# Patient Record
Sex: MALE | Race: WHITE | NOT HISPANIC OR LATINO | Employment: OTHER | URBAN - METROPOLITAN AREA
[De-identification: names, ages, dates, MRNs, and addresses within clinical notes are randomized per-mention and may not be internally consistent; named-entity substitution may affect disease eponyms.]

---

## 2020-04-22 ENCOUNTER — LAB VISIT (OUTPATIENT)
Dept: LAB | Facility: HOSPITAL | Age: 40
End: 2020-04-22
Attending: INTERNAL MEDICINE
Payer: COMMERCIAL

## 2020-04-22 DIAGNOSIS — Z00.00 PREVENTATIVE HEALTH CARE: Primary | ICD-10-CM

## 2020-04-22 DIAGNOSIS — Z00.00 PREVENTATIVE HEALTH CARE: ICD-10-CM

## 2020-04-22 LAB — SARS-COV-2 IGG SERPL QL IA: NEGATIVE

## 2020-04-22 PROCEDURE — 86769 SARS-COV-2 COVID-19 ANTIBODY: CPT

## 2020-04-22 PROCEDURE — 36415 COLL VENOUS BLD VENIPUNCTURE: CPT | Mod: PO

## 2023-11-10 ENCOUNTER — OFFICE VISIT (OUTPATIENT)
Dept: NEUROSURGERY | Facility: CLINIC | Age: 43
End: 2023-11-10

## 2023-11-10 DIAGNOSIS — M50.30 DDD (DEGENERATIVE DISC DISEASE), CERVICAL: Primary | ICD-10-CM

## 2023-11-10 DIAGNOSIS — M50.10 CERVICAL DISC DISORDER WITH RADICULOPATHY: ICD-10-CM

## 2023-11-10 PROCEDURE — 99203 PR OFFICE/OUTPT VISIT, NEW, LEVL III, 30-44 MIN: ICD-10-PCS | Mod: S$PBB,,, | Performed by: STUDENT IN AN ORGANIZED HEALTH CARE EDUCATION/TRAINING PROGRAM

## 2023-11-10 PROCEDURE — 99203 OFFICE O/P NEW LOW 30 MIN: CPT | Mod: S$PBB,,, | Performed by: STUDENT IN AN ORGANIZED HEALTH CARE EDUCATION/TRAINING PROGRAM

## 2023-11-10 RX ORDER — CYCLOBENZAPRINE HYDROCHLORIDE 7.5 MG/1
7.5 TABLET, FILM COATED ORAL 3 TIMES DAILY PRN
Qty: 18 TABLET | Refills: 0 | Status: SHIPPED | OUTPATIENT
Start: 2023-11-10 | End: 2023-11-20

## 2023-11-10 RX ORDER — METAXALONE 800 MG/1
800 TABLET ORAL 3 TIMES DAILY
Qty: 30 TABLET | Refills: 0 | Status: SHIPPED | OUTPATIENT
Start: 2023-11-10 | End: 2023-11-20

## 2023-11-10 NOTE — PROGRESS NOTES
[facility] Neurosurgery [facility]  Clinic Consult     Consult Requested By: No, Primary Doctor, Self, Aaareferral        SUBJECTIVE:     Chief Complaint: No chief complaint on file.      History of Present Illness:  Ever Castillo is a 43 y.o. male who presents with history of a bad left shoulder.  Neck pain intermittent arm pain.  He is numbness in the left 2 digits in the ulnar distribution he is status post cubital tunnel decompression.  He is had on and off neck pain however this has progressed after lifting a suitcase.  He is having severe parascapular pain.  Limited range of motion in his neck.  Had physical therapy and dry needling;  He managed this with anti-inflammatories and a Medrol Dosepak.  However it is progressed over the week now he feels some subjective weakness in the right upper extremity.  More severe pain.  Can not sleep has developed paresthesias and numbness in the 1st and 2nd potentially 3rd digit.  Parascapular pain going down the triceps      Pertinent and Recent history, provider evaluations, imaging and data reviewed in EPIC             Review of patient's allergies indicates:  Not on File    No past medical history on file.  No past surgical history on file.  No family history on file.        Review of Systems:      Constitutional: no fever, chills or night sweats. No abrupt changes in weight         OBJECTIVE:     Vital Signs (Most Recent):       Physical Exam:      General: well developed, well nourished, no distress. .  Mental Status: Awake, Alert, Oriented x 4  Language: No aphasia  Speech: No dysarthria  Head: normocephalic, atraumatic.  Neck: trachea midline, no JVD   Cardiovascular: no LE edema  Pulmonary: normal respirations, no signs of respiratory distress  Abdomen: soft, non-distended    Motor Strength:  No abnormal movements seen.     Strength  Deltoids Triceps Biceps Wrist Extension Wrist Flexion Hand  Interossei     Upper: R 5/5 5/5 5/5 5/5 5/5 5/5  5/5      L  5/5 5/5 5/5 5/5 5/5 5/5       Iliopsoas Quadriceps Knee  Flexion Tibialis  anterior Gastro- cnemius EHL  Foot Eversion Foot inversion   Lower: R 5/5 5/5 5/5 5/5 5/5 5/5 5/5 5/5 5/5    L 5/5 5/5 5/5 5/5 5/5 5/5 5/5 5/5 5/5   Limited rom pain with overhead    Left shoulder limits exam limited rom with shoulder hx        SILT,PP dec right hand 1 ,2 some of 3rd digit    Left 5 and split 4rht      DTR's: 1+ and symmetric in UE and LE  Chappell: absent      Gait: normal  + spurling             ASSESSMENT/PLAN:     DDD (degenerative disc disease), cervical    Cervical disc disorder with radiculopathy    Other orders  -     metaxalone (SKELAXIN) 800 MG tablet; Take 1 tablet (800 mg total) by mouth 3 (three) times daily. for 10 days  Dispense: 30 tablet; Refill: 0  -     cyclobenzaprine (FEXMID) 7.5 MG Tab; Take 1 tablet (7.5 mg total) by mouth 3 (three) times daily as needed.  Dispense: 18 tablet; Refill: 0      43-year-old.  With a history of left shoulder pathology limited range of motion, left cubital tunnel syndrome status post decompression with residual sensory deficit.  Presents with progressive severe neck and parascapular pain.  Progressing into severe pain, paresthesias and loss of sensation in the right upper extremity  He is no myelopathic signs and symptoms correlating with the potential C6, C7 radiculopathy  He is a high frequency and severity of pain he is attempted physical therapy dry needling, anti-inflammatories and a Medrol Dosepak.  He is unable to sleep  Affecting his ADLs.   Due to the severity radicular symptoms , and more than pain with paresthesias decreased sensation he is indicated for diagnostic workup.  We would him muscle relaxer for evenings.  As well as a intramuscular Toradol injection.  He does not want a sedating muscle relaxer and will first attempt skelaxin; if ineffective will have cyclobenzprine as back up option.  Were ordered a cervical MRI for diagnostic evaluation we  will review incomplete                  Alvaro Palmer MD  Neurosurgery

## 2024-01-08 PROBLEM — M19.019 OSTEOARTHROSIS, SHOULDER REGION: Status: ACTIVE | Noted: 2024-01-08

## 2024-11-13 ENCOUNTER — PATIENT MESSAGE (OUTPATIENT)
Dept: RESEARCH | Facility: HOSPITAL | Age: 44
End: 2024-11-13